# Patient Record
Sex: MALE | Race: ASIAN | NOT HISPANIC OR LATINO | Employment: FULL TIME | URBAN - METROPOLITAN AREA
[De-identification: names, ages, dates, MRNs, and addresses within clinical notes are randomized per-mention and may not be internally consistent; named-entity substitution may affect disease eponyms.]

---

## 2019-04-03 ENCOUNTER — HISTORICAL (OUTPATIENT)
Dept: ADMINISTRATIVE | Facility: HOSPITAL | Age: 34
End: 2019-04-03

## 2019-04-03 LAB
ABS NEUT (OLG): 1.8 X10(3)/MCL (ref 2.1–9.2)
ALBUMIN SERPL-MCNC: 4.4 GM/DL (ref 3.4–5)
ALBUMIN/GLOB SERPL: 2.75 {RATIO} (ref 1.5–2.5)
ALP SERPL-CCNC: 28 UNIT/L (ref 38–126)
ALT SERPL-CCNC: 24 UNIT/L (ref 7–52)
APPEARANCE, UA: CLEAR
AST SERPL-CCNC: 17 UNIT/L (ref 15–37)
BACTERIA #/AREA URNS AUTO: NORMAL /HPF
BILIRUB SERPL-MCNC: 0.6 MG/DL (ref 0.2–1)
BILIRUB UR QL STRIP: NEGATIVE MG/DL
BILIRUBIN DIRECT+TOT PNL SERPL-MCNC: 0.1 MG/DL (ref 0–0.5)
BILIRUBIN DIRECT+TOT PNL SERPL-MCNC: 0.5 MG/DL
BUN SERPL-MCNC: 17 MG/DL (ref 7–18)
CALCIUM SERPL-MCNC: 9 MG/DL (ref 8.5–10)
CHLORIDE SERPL-SCNC: 104 MMOL/L (ref 98–107)
CHOLEST SERPL-MCNC: 213 MG/DL (ref 0–200)
CHOLEST/HDLC SERPL: 3.9 {RATIO}
CO2 SERPL-SCNC: 29 MMOL/L (ref 21–32)
COLOR UR: YELLOW
CREAT SERPL-MCNC: 1.04 MG/DL (ref 0.6–1.3)
ERYTHROCYTE [DISTWIDTH] IN BLOOD BY AUTOMATED COUNT: 13.3 % (ref 11.5–17)
GLOBULIN SER-MCNC: 1.6 GM/DL (ref 1.2–3)
GLUCOSE (UA): NEGATIVE MG/DL
GLUCOSE SERPL-MCNC: 98 MG/DL (ref 74–106)
HCT VFR BLD AUTO: 44.7 % (ref 42–52)
HDLC SERPL-MCNC: 55 MG/DL (ref 35–60)
HGB BLD-MCNC: 15.2 GM/DL (ref 14–18)
HGB UR QL STRIP: NEGATIVE UNIT/L
KETONES UR QL STRIP: NEGATIVE MG/DL
LDLC SERPL CALC-MCNC: 119 MG/DL (ref 0–129)
LEUKOCYTE ESTERASE UR QL STRIP: NEGATIVE UNIT/L
LYMPHOCYTES # BLD AUTO: 1.9 X10(3)/MCL (ref 0.6–3.4)
LYMPHOCYTES NFR BLD AUTO: 45.3 % (ref 13–40)
MCH RBC QN AUTO: 30.7 PG (ref 27–31.2)
MCHC RBC AUTO-ENTMCNC: 34 GM/DL (ref 32–36)
MCV RBC AUTO: 90 FL (ref 80–94)
MONOCYTES # BLD AUTO: 0.4 X10(3)/MCL (ref 0.1–1.3)
MONOCYTES NFR BLD AUTO: 8.9 % (ref 0.1–24)
NEUTROPHILS NFR BLD AUTO: 45.8 % (ref 47–80)
NITRITE UR QL STRIP.AUTO: NEGATIVE
PH UR STRIP: 7 [PH]
PLATELET # BLD AUTO: 213 X10(3)/MCL (ref 130–400)
PMV BLD AUTO: 9.4 FL (ref 9.4–12.4)
POTASSIUM SERPL-SCNC: 4.1 MMOL/L (ref 3.5–5.1)
PROT SERPL-MCNC: 6 GM/DL (ref 6.4–8.2)
PROT UR QL STRIP: NEGATIVE MG/DL
RBC # BLD AUTO: 4.95 X10(6)/MCL (ref 4.7–6.1)
RBC #/AREA URNS HPF: NORMAL /HPF
SODIUM SERPL-SCNC: 138 MMOL/L (ref 136–145)
SP GR UR STRIP: 1.01
SQUAMOUS EPITHELIAL, UA: NORMAL /LPF
TRIGL SERPL-MCNC: 55 MG/DL (ref 30–150)
UROBILINOGEN UR STRIP-ACNC: 0.2 MG/DL
VLDLC SERPL CALC-MCNC: 11 MG/DL
WBC # SPEC AUTO: 4.1 X10(3)/MCL (ref 4.5–11.5)
WBC #/AREA URNS AUTO: NORMAL /[HPF]

## 2020-07-21 ENCOUNTER — HISTORICAL (OUTPATIENT)
Dept: ADMINISTRATIVE | Facility: HOSPITAL | Age: 35
End: 2020-07-21

## 2020-07-21 LAB
ABS NEUT (OLG): 3.2 X10(3)/MCL (ref 2.1–9.2)
ALBUMIN SERPL-MCNC: 4.5 GM/DL (ref 3.4–5)
ALBUMIN/GLOB SERPL: 1.96 {RATIO} (ref 1.5–2.5)
ALP SERPL-CCNC: 48 UNIT/L (ref 38–126)
ALT SERPL-CCNC: 26 UNIT/L (ref 7–52)
APPEARANCE, UA: CLEAR
AST SERPL-CCNC: 19 UNIT/L (ref 15–37)
BACTERIA #/AREA URNS AUTO: NORMAL /HPF
BILIRUB SERPL-MCNC: 0.4 MG/DL (ref 0.2–1)
BILIRUB UR QL STRIP: NEGATIVE MG/DL
BILIRUBIN DIRECT+TOT PNL SERPL-MCNC: 0.1 MG/DL (ref 0–0.5)
BILIRUBIN DIRECT+TOT PNL SERPL-MCNC: 0.3 MG/DL
BUN SERPL-MCNC: 19 MG/DL (ref 7–18)
CALCIUM SERPL-MCNC: 9.3 MG/DL (ref 8.5–10)
CHLORIDE SERPL-SCNC: 102 MMOL/L (ref 98–107)
CHOLEST SERPL-MCNC: 256 MG/DL (ref 0–200)
CHOLEST/HDLC SERPL: 3.5 {RATIO}
CO2 SERPL-SCNC: 31 MMOL/L (ref 21–32)
COLOR UR: YELLOW
CREAT SERPL-MCNC: 1.06 MG/DL (ref 0.6–1.3)
ERYTHROCYTE [DISTWIDTH] IN BLOOD BY AUTOMATED COUNT: 13.7 % (ref 11.5–17)
GLOBULIN SER-MCNC: 2.3 GM/DL (ref 1.2–3)
GLUCOSE (UA): NEGATIVE MG/DL
GLUCOSE SERPL-MCNC: 97 MG/DL (ref 74–106)
HCT VFR BLD AUTO: 46.4 % (ref 42–52)
HDLC SERPL-MCNC: 74 MG/DL (ref 35–60)
HGB BLD-MCNC: 15.7 GM/DL (ref 14–18)
HGB UR QL STRIP: NEGATIVE UNIT/L
KETONES UR QL STRIP: NEGATIVE MG/DL
LDLC SERPL CALC-MCNC: 148 MG/DL (ref 0–129)
LEUKOCYTE ESTERASE UR QL STRIP: NEGATIVE UNIT/L
LYMPHOCYTES # BLD AUTO: 1.8 X10(3)/MCL (ref 0.6–3.4)
LYMPHOCYTES NFR BLD AUTO: 33.2 % (ref 13–40)
MCH RBC QN AUTO: 30.4 PG (ref 27–31.2)
MCHC RBC AUTO-ENTMCNC: 34 GM/DL (ref 32–36)
MCV RBC AUTO: 90 FL (ref 80–94)
MONOCYTES # BLD AUTO: 0.5 X10(3)/MCL (ref 0.1–1.3)
MONOCYTES NFR BLD AUTO: 9.6 % (ref 0.1–24)
NEUTROPHILS NFR BLD AUTO: 57.2 % (ref 47–80)
NITRITE UR QL STRIP.AUTO: NEGATIVE
PH UR STRIP: 6 [PH]
PLATELET # BLD AUTO: 218 X10(3)/MCL (ref 130–400)
PMV BLD AUTO: 9.8 FL (ref 9.4–12.4)
POTASSIUM SERPL-SCNC: 4.1 MMOL/L (ref 3.5–5.1)
PROT SERPL-MCNC: 6.8 GM/DL (ref 6.4–8.2)
PROT UR QL STRIP: NEGATIVE MG/DL
RBC # BLD AUTO: 5.16 X10(6)/MCL (ref 4.7–6.1)
RBC #/AREA URNS HPF: NORMAL /HPF
SODIUM SERPL-SCNC: 139 MMOL/L (ref 136–145)
SP GR UR STRIP: 1.01
SQUAMOUS EPITHELIAL, UA: NORMAL /LPF
TRIGL SERPL-MCNC: 92 MG/DL (ref 30–150)
UROBILINOGEN UR STRIP-ACNC: 0.2 MG/DL
VLDLC SERPL CALC-MCNC: 18.4 MG/DL
WBC # SPEC AUTO: 5.5 X10(3)/MCL (ref 4.5–11.5)
WBC #/AREA URNS AUTO: NORMAL /[HPF]

## 2021-08-03 ENCOUNTER — HISTORICAL (OUTPATIENT)
Dept: ADMINISTRATIVE | Facility: HOSPITAL | Age: 36
End: 2021-08-03

## 2021-08-03 LAB
ABS NEUT (OLG): 2.9 X10(3)/MCL (ref 2.1–9.2)
ALBUMIN SERPL-MCNC: 4.4 GM/DL (ref 3.4–5)
ALBUMIN/GLOB SERPL: 2.2 {RATIO} (ref 1.5–2.5)
ALP SERPL-CCNC: 43 UNIT/L (ref 38–126)
ALT SERPL-CCNC: 20 UNIT/L (ref 7–52)
APPEARANCE, UA: CLEAR
AST SERPL-CCNC: 19 UNIT/L (ref 15–37)
BACTERIA #/AREA URNS AUTO: NORMAL /HPF
BILIRUB SERPL-MCNC: 0.6 MG/DL (ref 0.2–1)
BILIRUB UR QL STRIP: NEGATIVE MG/DL
BILIRUBIN DIRECT+TOT PNL SERPL-MCNC: 0.1 MG/DL (ref 0–0.5)
BILIRUBIN DIRECT+TOT PNL SERPL-MCNC: 0.5 MG/DL
BUN SERPL-MCNC: 22 MG/DL (ref 7–18)
CALCIUM SERPL-MCNC: 9.1 MG/DL (ref 8.5–10.1)
CHLORIDE SERPL-SCNC: 105 MMOL/L (ref 98–107)
CHOLEST SERPL-MCNC: 248 MG/DL (ref 0–200)
CHOLEST/HDLC SERPL: 3.5 {RATIO}
CO2 SERPL-SCNC: 31 MMOL/L (ref 21–32)
COLOR UR: YELLOW
CREAT SERPL-MCNC: 0.97 MG/DL (ref 0.6–1.3)
ERYTHROCYTE [DISTWIDTH] IN BLOOD BY AUTOMATED COUNT: 13.3 % (ref 11.5–17)
GLOBULIN SER-MCNC: 2 GM/DL (ref 1.2–3)
GLUCOSE (UA): NEGATIVE MG/DL
GLUCOSE SERPL-MCNC: 98 MG/DL (ref 74–106)
HCT VFR BLD AUTO: 45.9 % (ref 42–52)
HDLC SERPL-MCNC: 71 MG/DL (ref 35–60)
HGB BLD-MCNC: 15.4 GM/DL (ref 14–18)
HGB UR QL STRIP: NEGATIVE UNIT/L
KETONES UR QL STRIP: NEGATIVE MG/DL
LDLC SERPL CALC-MCNC: 138 MG/DL (ref 0–129)
LEUKOCYTE ESTERASE UR QL STRIP: NEGATIVE UNIT/L
LYMPHOCYTES # BLD AUTO: 2 X10(3)/MCL (ref 0.6–3.4)
LYMPHOCYTES NFR BLD AUTO: 37.3 % (ref 13–40)
MCH RBC QN AUTO: 30.3 PG (ref 27–31.2)
MCHC RBC AUTO-ENTMCNC: 34 GM/DL (ref 32–36)
MCV RBC AUTO: 90 FL (ref 80–94)
MONOCYTES # BLD AUTO: 0.4 X10(3)/MCL (ref 0.1–1.3)
MONOCYTES NFR BLD AUTO: 8 % (ref 0.1–24)
NEUTROPHILS NFR BLD AUTO: 54.7 % (ref 47–80)
NITRITE UR QL STRIP.AUTO: NEGATIVE
PH UR STRIP: 7 [PH]
PLATELET # BLD AUTO: 238 X10(3)/MCL (ref 130–400)
PMV BLD AUTO: 9.6 FL (ref 9.4–12.4)
POTASSIUM SERPL-SCNC: 4.3 MMOL/L (ref 3.5–5.1)
PROT SERPL-MCNC: 6.4 GM/DL (ref 6.4–8.2)
PROT UR QL STRIP: NEGATIVE MG/DL
RBC # BLD AUTO: 5.08 X10(6)/MCL (ref 4.7–6.1)
RBC #/AREA URNS HPF: NORMAL /HPF
SODIUM SERPL-SCNC: 141 MMOL/L (ref 136–145)
SP GR UR STRIP: 1.02
SQUAMOUS EPITHELIAL, UA: NORMAL /LPF
TRIGL SERPL-MCNC: 84 MG/DL (ref 30–150)
UROBILINOGEN UR STRIP-ACNC: 0.2 MG/DL
VLDLC SERPL CALC-MCNC: 16.8 MG/DL
WBC # SPEC AUTO: 5.3 X10(3)/MCL (ref 4.5–11.5)
WBC #/AREA URNS AUTO: NORMAL /[HPF]

## 2021-12-02 ENCOUNTER — HISTORICAL (OUTPATIENT)
Dept: ADMINISTRATIVE | Facility: HOSPITAL | Age: 36
End: 2021-12-02

## 2022-04-11 ENCOUNTER — HISTORICAL (OUTPATIENT)
Dept: ADMINISTRATIVE | Facility: HOSPITAL | Age: 37
End: 2022-04-11

## 2022-04-28 VITALS
DIASTOLIC BLOOD PRESSURE: 78 MMHG | WEIGHT: 171.5 LBS | HEIGHT: 65 IN | SYSTOLIC BLOOD PRESSURE: 116 MMHG | BODY MASS INDEX: 28.57 KG/M2 | OXYGEN SATURATION: 95 %

## 2022-08-10 PROBLEM — Z00.00 WELLNESS EXAMINATION: Status: ACTIVE | Noted: 2022-08-10

## 2022-11-14 PROBLEM — Z00.00 WELLNESS EXAMINATION: Status: RESOLVED | Noted: 2022-08-10 | Resolved: 2022-11-14

## 2024-08-19 ENCOUNTER — TELEPHONE (OUTPATIENT)
Dept: TRANSPLANT | Facility: CLINIC | Age: 39
End: 2024-08-19

## 2024-08-19 NOTE — TELEPHONE ENCOUNTER
"Contact initiated by pt via phone to volunteer to be a potential donor. Chart opened & donor referral episode opened. Screening questions asked.     : 87  HT: 5'5"  WT: 165lb  BMI: 27.5  AGE: 38  Health issues: none  Previous Surgery: none  Blood type: none, but maybe A-  Health Insurance: Yes    EMAIL: [email or web address]    Relation/Recipient: Son / SMary Kay Tinajero    Donor health history forms, donor manual & donor evaluation consent sent via email on 24.        "

## 2024-08-19 NOTE — TELEPHONE ENCOUNTER
----- Message from Corwin Tejeda RN sent at 2024 12:41 PM CDT -----  Regarding: FW: Ptoential Donor  Contact: Pt  [phone]    ----- Message -----  From: Laura Jain  Sent: 2024   8:17 AM CDT  To: [facility] Liver Living Donor Coordinator  Subject: Ptoential Donor                                           Name of Caller: Go     Contact Preference: [phone]     Nature of Call: Would like to get information about being a potential donor for his father Son, Tanvi WILLIAM 1956

## 2024-08-28 ENCOUNTER — TELEPHONE (OUTPATIENT)
Dept: TRANSPLANT | Facility: CLINIC | Age: 39
End: 2024-08-28

## 2024-09-19 ENCOUNTER — TELEPHONE (OUTPATIENT)
Dept: TRANSPLANT | Facility: CLINIC | Age: 39
End: 2024-09-19

## 2024-10-01 ENCOUNTER — TELEPHONE (OUTPATIENT)
Dept: TRANSPLANT | Facility: CLINIC | Age: 39
End: 2024-10-01

## 2024-10-01 NOTE — TELEPHONE ENCOUNTER
Called Go to determine if he is still interested in donation. He is, and he asked that we send the info to his email again. Explained that now that his dad is listed, we could proceed with donor workup if his health history looks ok. He states he will work on filling it out.

## 2024-10-17 VITALS — BODY MASS INDEX: 27.49 KG/M2 | WEIGHT: 165 LBS | HEIGHT: 65 IN

## 2024-10-17 NOTE — PROGRESS NOTES
Chart updated per health history form completed by patient. Health history and risk assessment forms uploaded to media.

## 2024-10-23 ENCOUNTER — TELEPHONE (OUTPATIENT)
Dept: TRANSPLANT | Facility: CLINIC | Age: 39
End: 2024-10-23

## 2024-10-23 NOTE — TELEPHONE ENCOUNTER
Called pt to request times/dates for BRUNILDA call. Pt saw email from last week, but didn't respond. Call spreadsheet updated.

## 2024-10-24 ENCOUNTER — TELEPHONE (OUTPATIENT)
Dept: TRANSPLANT | Facility: CLINIC | Age: 39
End: 2024-10-24
Payer: MEDICARE

## 2024-10-28 DIAGNOSIS — Z00.5 TRANSPLANT DONOR EVALUATION: Primary | ICD-10-CM

## 2024-10-30 ENCOUNTER — TELEPHONE (OUTPATIENT)
Dept: TRANSPLANT | Facility: CLINIC | Age: 39
End: 2024-10-30
Payer: MEDICARE

## 2024-11-07 ENCOUNTER — TELEPHONE (OUTPATIENT)
Dept: TRANSPLANT | Facility: CLINIC | Age: 39
End: 2024-11-07
Payer: MEDICARE

## 2024-11-07 NOTE — TELEPHONE ENCOUNTER
Called pt re: work schedule. He got it today & is discussing dates with his sister (caregiver). He will email donor coordinator when he gets some potential dates that work for both of them.

## 2024-11-13 ENCOUNTER — TELEPHONE (OUTPATIENT)
Dept: TRANSPLANT | Facility: CLINIC | Age: 39
End: 2024-11-13
Payer: MEDICARE

## 2024-11-13 NOTE — TELEPHONE ENCOUNTER
Received email from patient asking if he could come for testing on 11/21 since his father has testing here that day.     Advised that we can do some of the evaluation (labs, imaging, etc), but that his caregiver also needs to come along for the consults, especially the SW consult.     Awaiting f/up from patient.

## 2024-11-18 ENCOUNTER — TELEPHONE (OUTPATIENT)
Dept: TRANSPLANT | Facility: CLINIC | Age: 39
End: 2024-11-18
Payer: MEDICARE

## 2024-11-18 NOTE — TELEPHONE ENCOUNTER
----- Message from Teion sent at 11/18/2024  1:07 PM CST -----    MRI [facility]'ed pt for 6pm on 11/21. Called and sp to pt; his dad has appt son 11/21 and was hoping to do his on the same day, but pts dads appt will be completed around 12noon and can't wait till 6pm for MRIs. Pt said that he will call/send my chart message with some other dates that he can come, biut he would like to keep the appt [facility]'ed for 11/21 before 12noon.  .

## 2024-11-19 ENCOUNTER — TELEPHONE (OUTPATIENT)
Dept: TRANSPLANT | Facility: CLINIC | Age: 39
End: 2024-11-19
Payer: MEDICARE

## 2024-11-19 NOTE — TELEPHONE ENCOUNTER
----- Message from [facility] sent at 11/19/2024  2:56 PM CST -----    Called and sp to pt; went over appts [facility]'ed for 11/21.  .

## 2024-11-21 ENCOUNTER — HOSPITAL ENCOUNTER (OUTPATIENT)
Dept: RADIOLOGY | Facility: HOSPITAL | Age: 39
Discharge: HOME | End: 2024-11-21
Attending: INTERNAL MEDICINE
Payer: MEDICARE

## 2024-11-21 ENCOUNTER — HOSPITAL ENCOUNTER (OUTPATIENT)
Dept: CARDIOLOGY | Facility: CLINIC | Age: 39
Discharge: HOME | End: 2024-11-21
Payer: MEDICARE

## 2024-11-21 ENCOUNTER — CLINICAL SUPPORT (OUTPATIENT)
Dept: TRANSPLANT | Facility: CLINIC | Age: 39
End: 2024-11-21
Payer: MEDICARE

## 2024-11-21 ENCOUNTER — HOSPITAL ENCOUNTER (OUTPATIENT)
Dept: RADIOLOGY | Facility: HOSPITAL | Age: 39
Discharge: HOME | End: 2024-11-21
Attending: TRANSPLANT SURGERY
Payer: MEDICARE

## 2024-11-21 VITALS
OXYGEN SATURATION: 99 % | WEIGHT: 169.75 LBS | SYSTOLIC BLOOD PRESSURE: 139 MMHG | BODY MASS INDEX: 28.28 KG/M2 | TEMPERATURE: 98 F | DIASTOLIC BLOOD PRESSURE: 87 MMHG | RESPIRATION RATE: 18 BRPM | HEIGHT: 65 IN | HEART RATE: 62 BPM

## 2024-11-21 DIAGNOSIS — Z00.5 TRANSPLANT DONOR EVALUATION: ICD-10-CM

## 2024-11-21 DIAGNOSIS — Z00.5 TRANSPLANT DONOR EVALUATION: Primary | ICD-10-CM

## 2024-11-21 LAB
OHS QRS DURATION: 84 MS
OHS QTC CALCULATION: 431 MS

## 2024-11-21 PROCEDURE — 99999 PR PBB SHADOW E&M-EST. PATIENT-LVL III: CPT | Mod: PBBFAC,TXP,,

## 2024-11-21 PROCEDURE — 71046 X-RAY EXAM CHEST 2 VIEWS: CPT | Mod: TC,TXP

## 2024-11-21 PROCEDURE — 74175 CTA ABDOMEN W/CONTRAST: CPT | Mod: TC,TXP

## 2024-11-21 PROCEDURE — 25500020 PHARM REV CODE 255: Mod: TXP | Performed by: TRANSPLANT SURGERY

## 2024-11-21 RX ADMIN — IOHEXOL 75 ML: 350 INJECTION, SOLUTION INTRAVENOUS at 07:11

## 2024-11-21 NOTE — PROGRESS NOTES
LIVER LIVING DONOR EDUCATION NOTE    Topics covered included:  Liver donation is done voluntarily and of the donor's free will. Process can stop at anytime.  Benefits: [place] waitlist time for recipient. More time to prepare. Tests and surgery can be planned.  Tests/Imaging Studies: Various Laboratory testing, chest x-ray, CT of abdomen, MRI/MRCP, Ultrasound, EKG, Echocardiogram, GYN/Pap/Mammogram - females. Multiple medical consults to include: Donor Surgeon, Second Surgeon, Hepatologist, Outside Medical Consult, Living Donor Advocate, Transplant Coordinator, , Financial Counselor, Anesthesiology, Psychiatry (if indicated).  Risks discussed: Bleeding, Clots, Deep Vein Thrombosis, Pulmonary Embolus, Need for retransplant, death, infection, allergic reaction, anesthesia, constipation, pneumonia.  Potential donor should refrain from high risk behaviours such as body piercing, tattoos, IV drugs, alcohol use, cigarette smoking.  Lovenox subcutaneously for 6 weeks - charged to recipient account before discharge.    To recovery room, ICU, TSU. [place], Pain Control, IV, NG tube, incision, plan for discharge.  Off of work for 6 weeks.  Post op clinic visit within one week of post discharge, then at the discretion of the surgeon.  Report to [facility] at 6 months, 1 year, 2 year post donation.  Importance of long term donor follow up for future of donor program(s).  Patient notified that HIV Testing is required for transplant evaluation and repeated at scheduled intervals before and after transplant. Explained that education will be provided, results will be discussed, and the appropriate consults will be scheduled if needed.      All questions were answered. Written educational material was provided. The patient is a suitable candidate for living liver donation.    Met with Go Hopson in clinic.  His sister was not with him today.

## 2024-12-04 ENCOUNTER — TELEPHONE (OUTPATIENT)
Dept: TRANSPLANT | Facility: CLINIC | Age: 39
End: 2024-12-04
Payer: MEDICARE

## 2024-12-04 NOTE — TELEPHONE ENCOUNTER
Email  message sent to patient to follow up with request for dates to finish eval with caregiver present.

## 2025-01-02 ENCOUNTER — PATIENT MESSAGE (OUTPATIENT)
Dept: TRANSPLANT | Facility: CLINIC | Age: 40
End: 2025-01-02
Payer: MEDICARE

## 2025-05-15 ENCOUNTER — TELEPHONE (OUTPATIENT)
Dept: TRANSPLANT | Facility: CLINIC | Age: 40
End: 2025-05-15
Payer: MEDICARE